# Patient Record
Sex: FEMALE | Race: WHITE | Employment: STUDENT | URBAN - NONMETROPOLITAN AREA
[De-identification: names, ages, dates, MRNs, and addresses within clinical notes are randomized per-mention and may not be internally consistent; named-entity substitution may affect disease eponyms.]

---
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
## 2025-01-22 ENCOUNTER — HOSPITAL ENCOUNTER (INPATIENT)
Age: 20
LOS: 2 days | Discharge: HOME | DRG: 817 | End: 2025-01-24
Attending: EMERGENCY MEDICINE | Admitting: PSYCHIATRY & NEUROLOGY
Payer: MEDICAID

## 2025-01-22 ENCOUNTER — APPOINTMENT (OUTPATIENT)
Dept: CT IMAGING | Age: 20
DRG: 817 | End: 2025-01-22
Payer: MEDICAID

## 2025-01-22 DIAGNOSIS — T50.902A INTENTIONAL OVERDOSE, INITIAL ENCOUNTER (HCC): Primary | ICD-10-CM

## 2025-01-22 DIAGNOSIS — N39.0 URINARY TRACT INFECTION WITHOUT HEMATURIA, SITE UNSPECIFIED: ICD-10-CM

## 2025-01-22 DIAGNOSIS — R45.851 SUICIDAL IDEATION: ICD-10-CM

## 2025-01-22 PROBLEM — T14.91XA SUICIDE ATTEMPT (HCC): Status: ACTIVE | Noted: 2025-01-22

## 2025-01-22 LAB
ALBUMIN SERPL-MCNC: 4.3 G/DL (ref 3.5–5.2)
ALP SERPL-CCNC: 119 U/L (ref 35–104)
ALT SERPL-CCNC: 16 U/L (ref 5–33)
AMPHET UR QL SCN: NEGATIVE
ANION GAP SERPL CALCULATED.3IONS-SCNC: 13 MMOL/L (ref 8–16)
APAP SERPL-MCNC: <5 UG/ML (ref 10–30)
AST SERPL-CCNC: 13 U/L (ref 5–32)
BACTERIA URNS QL MICRO: ABNORMAL /HPF
BARBITURATES UR QL SCN: NEGATIVE
BASOPHILS # BLD: 0 K/UL (ref 0–0.2)
BASOPHILS NFR BLD: 0.3 % (ref 0–1)
BENZODIAZ UR QL SCN: NEGATIVE
BILIRUB SERPL-MCNC: 0.4 MG/DL (ref 0.2–1.2)
BILIRUB UR QL STRIP: NEGATIVE
BUN SERPL-MCNC: 10 MG/DL (ref 6–20)
BUPRENORPHINE URINE: NEGATIVE
CALCIUM SERPL-MCNC: 9.4 MG/DL (ref 8.6–10)
CANNABINOIDS UR QL SCN: NEGATIVE
CHLORIDE SERPL-SCNC: 104 MMOL/L (ref 98–107)
CLARITY UR: ABNORMAL
CO2 SERPL-SCNC: 24 MMOL/L (ref 22–29)
COCAINE UR QL SCN: NEGATIVE
COLOR UR: YELLOW
CREAT SERPL-MCNC: 0.8 MG/DL (ref 0.5–0.9)
CRYSTALS URNS MICRO: ABNORMAL /HPF
DRUG SCREEN COMMENT UR-IMP: ABNORMAL
EOSINOPHIL # BLD: 0.2 K/UL (ref 0–0.6)
EOSINOPHIL NFR BLD: 2.3 % (ref 0–5)
EPI CELLS #/AREA URNS AUTO: 10 /HPF (ref 0–5)
ERYTHROCYTE [DISTWIDTH] IN BLOOD BY AUTOMATED COUNT: 13.1 % (ref 11.5–14.5)
ETHANOLAMINE SERPL-MCNC: <10 MG/DL (ref 0–10)
FENTANYL SCREEN, URINE: NEGATIVE
GLUCOSE SERPL-MCNC: 109 MG/DL (ref 70–99)
GLUCOSE UR STRIP.AUTO-MCNC: NEGATIVE MG/DL
HCG UR QL: NEGATIVE
HCT VFR BLD AUTO: 42.6 % (ref 37–47)
HGB BLD-MCNC: 14.4 G/DL (ref 12–16)
HGB UR STRIP.AUTO-MCNC: NEGATIVE MG/L
HYALINE CASTS #/AREA URNS AUTO: 7 /HPF (ref 0–8)
IMM GRANULOCYTES # BLD: 0 K/UL
KETONES UR STRIP.AUTO-MCNC: NEGATIVE MG/DL
LEUKOCYTE ESTERASE UR QL STRIP.AUTO: ABNORMAL
LYMPHOCYTES # BLD: 2.2 K/UL (ref 1.1–4.5)
LYMPHOCYTES NFR BLD: 22.8 % (ref 20–40)
MCH RBC QN AUTO: 29.9 PG (ref 27–31)
MCHC RBC AUTO-ENTMCNC: 33.8 G/DL (ref 33–37)
MCV RBC AUTO: 88.6 FL (ref 81–99)
METHADONE UR QL SCN: NEGATIVE
METHAMPHETAMINE, URINE: NEGATIVE
MONOCYTES # BLD: 0.6 K/UL (ref 0–0.9)
MONOCYTES NFR BLD: 6.3 % (ref 0–10)
NEUTROPHILS # BLD: 6.5 K/UL (ref 1.5–7.5)
NEUTS SEG NFR BLD: 68.1 % (ref 50–65)
NITRITE UR QL STRIP.AUTO: NEGATIVE
OPIATES UR QL SCN: NEGATIVE
OXYCODONE UR QL SCN: NEGATIVE
PCP UR QL SCN: NEGATIVE
PH UR STRIP.AUTO: 6.5 [PH] (ref 5–8)
PLATELET # BLD AUTO: 379 K/UL (ref 130–400)
PMV BLD AUTO: 9.5 FL (ref 9.4–12.3)
POTASSIUM SERPL-SCNC: 4 MMOL/L (ref 3.5–5.1)
PROT SERPL-MCNC: 8.1 G/DL (ref 6.4–8.3)
PROT UR STRIP.AUTO-MCNC: NEGATIVE MG/DL
RBC # BLD AUTO: 4.81 M/UL (ref 4.2–5.4)
RBC #/AREA URNS AUTO: 2 /HPF (ref 0–4)
SALICYLATES SERPL-MCNC: <0.3 MG/DL (ref 3–10)
SARS-COV-2 RDRP RESP QL NAA+PROBE: NOT DETECTED
SODIUM SERPL-SCNC: 141 MMOL/L (ref 136–145)
SP GR UR STRIP.AUTO: 1.02 (ref 1–1.03)
TRICYCLIC ANTIDEPRESSANTS, UR: POSITIVE
UROBILINOGEN UR STRIP.AUTO-MCNC: 1 E.U./DL
WBC # BLD AUTO: 9.5 K/UL (ref 4.8–10.8)
WBC #/AREA URNS AUTO: 12 /HPF (ref 0–5)

## 2025-01-22 PROCEDURE — 93005 ELECTROCARDIOGRAM TRACING: CPT | Performed by: EMERGENCY MEDICINE

## 2025-01-22 PROCEDURE — 87086 URINE CULTURE/COLONY COUNT: CPT

## 2025-01-22 PROCEDURE — 36415 COLL VENOUS BLD VENIPUNCTURE: CPT

## 2025-01-22 PROCEDURE — 80053 COMPREHEN METABOLIC PANEL: CPT

## 2025-01-22 PROCEDURE — 82306 VITAMIN D 25 HYDROXY: CPT

## 2025-01-22 PROCEDURE — 1240000000 HC EMOTIONAL WELLNESS R&B

## 2025-01-22 PROCEDURE — G0480 DRUG TEST DEF 1-7 CLASSES: HCPCS

## 2025-01-22 PROCEDURE — 99285 EMERGENCY DEPT VISIT HI MDM: CPT

## 2025-01-22 PROCEDURE — 80143 DRUG ASSAY ACETAMINOPHEN: CPT

## 2025-01-22 PROCEDURE — 80307 DRUG TEST PRSMV CHEM ANLYZR: CPT

## 2025-01-22 PROCEDURE — 80179 DRUG ASSAY SALICYLATE: CPT

## 2025-01-22 PROCEDURE — 84443 ASSAY THYROID STIM HORMONE: CPT

## 2025-01-22 PROCEDURE — 81001 URINALYSIS AUTO W/SCOPE: CPT

## 2025-01-22 PROCEDURE — 87635 SARS-COV-2 COVID-19 AMP PRB: CPT

## 2025-01-22 PROCEDURE — 6370000000 HC RX 637 (ALT 250 FOR IP): Performed by: PSYCHIATRY & NEUROLOGY

## 2025-01-22 PROCEDURE — 85025 COMPLETE CBC W/AUTO DIFF WBC: CPT

## 2025-01-22 PROCEDURE — 82607 VITAMIN B-12: CPT

## 2025-01-22 PROCEDURE — 87077 CULTURE AEROBIC IDENTIFY: CPT

## 2025-01-22 PROCEDURE — 82077 ASSAY SPEC XCP UR&BREATH IA: CPT

## 2025-01-22 PROCEDURE — 84703 CHORIONIC GONADOTROPIN ASSAY: CPT

## 2025-01-22 PROCEDURE — 6370000000 HC RX 637 (ALT 250 FOR IP): Performed by: EMERGENCY MEDICINE

## 2025-01-22 PROCEDURE — 70450 CT HEAD/BRAIN W/O DYE: CPT

## 2025-01-22 PROCEDURE — 87186 SC STD MICRODIL/AGAR DIL: CPT

## 2025-01-22 RX ORDER — TRAZODONE HYDROCHLORIDE 50 MG/1
50 TABLET, FILM COATED ORAL NIGHTLY PRN
Status: DISCONTINUED | OUTPATIENT
Start: 2025-01-22 | End: 2025-01-24 | Stop reason: HOSPADM

## 2025-01-22 RX ORDER — NITROFURANTOIN 25; 75 MG/1; MG/1
100 CAPSULE ORAL ONCE
Status: COMPLETED | OUTPATIENT
Start: 2025-01-22 | End: 2025-01-22

## 2025-01-22 RX ORDER — SUMATRIPTAN 50 MG/1
50 TABLET, FILM COATED ORAL PRN
COMMUNITY
Start: 2024-05-06 | End: 2025-05-07

## 2025-01-22 RX ORDER — POLYETHYLENE GLYCOL 3350 17 G/17G
17 POWDER, FOR SOLUTION ORAL DAILY PRN
Status: DISCONTINUED | OUTPATIENT
Start: 2025-01-22 | End: 2025-01-24 | Stop reason: HOSPADM

## 2025-01-22 RX ORDER — MECOBALAMIN 5000 MCG
5 TABLET,DISINTEGRATING ORAL NIGHTLY PRN
Status: DISCONTINUED | OUTPATIENT
Start: 2025-01-22 | End: 2025-01-24 | Stop reason: HOSPADM

## 2025-01-22 RX ORDER — HYDROXYZINE HYDROCHLORIDE 25 MG/1
25 TABLET, FILM COATED ORAL 3 TIMES DAILY PRN
Status: DISCONTINUED | OUTPATIENT
Start: 2025-01-22 | End: 2025-01-24 | Stop reason: HOSPADM

## 2025-01-22 RX ORDER — ACETAMINOPHEN 325 MG/1
650 TABLET ORAL EVERY 4 HOURS PRN
Status: DISCONTINUED | OUTPATIENT
Start: 2025-01-22 | End: 2025-01-24 | Stop reason: HOSPADM

## 2025-01-22 RX ADMIN — TRAZODONE HYDROCHLORIDE 50 MG: 50 TABLET ORAL at 23:10

## 2025-01-22 RX ADMIN — NITROFURANTOIN MONOHYDRATE/MACROCRYSTALS 100 MG: 75; 25 CAPSULE ORAL at 20:43

## 2025-01-22 RX ADMIN — Medication 5 MG: at 23:10

## 2025-01-22 RX ADMIN — HYDROXYZINE HYDROCHLORIDE 25 MG: 25 TABLET, FILM COATED ORAL at 23:10

## 2025-01-22 SDOH — ECONOMIC STABILITY: FOOD INSECURITY: WITHIN THE PAST 12 MONTHS, YOU WORRIED THAT YOUR FOOD WOULD RUN OUT BEFORE YOU GOT MONEY TO BUY MORE.: NEVER TRUE

## 2025-01-22 SDOH — ECONOMIC STABILITY: INCOME INSECURITY: HOW HARD IS IT FOR YOU TO PAY FOR THE VERY BASICS LIKE FOOD, HOUSING, MEDICAL CARE, AND HEATING?: NOT HARD AT ALL

## 2025-01-22 SDOH — ECONOMIC STABILITY: INCOME INSECURITY: IN THE PAST 12 MONTHS, HAS THE ELECTRIC, GAS, OIL, OR WATER COMPANY THREATENED TO SHUT OFF SERVICE IN YOUR HOME?: NO

## 2025-01-22 ASSESSMENT — ENCOUNTER SYMPTOMS
SHORTNESS OF BREATH: 0
SORE THROAT: 0
VOMITING: 0
RHINORRHEA: 0
NAUSEA: 1
ABDOMINAL PAIN: 0
BACK PAIN: 0
DIARRHEA: 0

## 2025-01-22 ASSESSMENT — PATIENT HEALTH QUESTIONNAIRE - PHQ9
2. FEELING DOWN, DEPRESSED OR HOPELESS: SEVERAL DAYS
SUM OF ALL RESPONSES TO PHQ QUESTIONS 1-9: 1
SUM OF ALL RESPONSES TO PHQ9 QUESTIONS 1 & 2: 1
SUM OF ALL RESPONSES TO PHQ QUESTIONS 1-9: 1
1. LITTLE INTEREST OR PLEASURE IN DOING THINGS: NOT AT ALL
SUM OF ALL RESPONSES TO PHQ QUESTIONS 1-9: 1
SUM OF ALL RESPONSES TO PHQ QUESTIONS 1-9: 1

## 2025-01-22 ASSESSMENT — SLEEP AND FATIGUE QUESTIONNAIRES: AVERAGE NUMBER OF SLEEP HOURS: 9

## 2025-01-22 NOTE — ED NOTES
Posion control called this RN and updated them on pt. Recommended continuing to monitor until 8 hours post ingestion and to call if salicylate and acetaminophen were high on repeats. Recommended symptomatic care for now. Dr. Ortiz notified

## 2025-01-22 NOTE — ED NOTES
Pt is alert, But is unable to answer some orientation questions correctly. 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 Unsure of pt cognitive functiion at baseline family notified me that she has a shunt in her brain
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
## 2025-01-22 NOTE — ED NOTES
Spoke with Brock at poison control. Per EMS pt took 20 of her Midol pills, pt verbalized that the name was Charlotte and she took them around 1100. Poison control notified me that these medications can contain caffeine, acetaminophen and aspirin. They said without having the exact bottle to treat situation as if they contain all those ingredients. They said high caffeine amounts can cause seiuzures Tachycarida and HTN. Recommended charcoal around 1 hour post ingestion. Recommended drawing a repeat aspirin and acetaminophen 4 hours post ingestion. If Acetaminophen is greater than 150 they recommended mucomyst or IV acetadose which they can provide the dosing once we get results. Can give Iv fluids if needed and Benzos for seizure like activity. Recommended to monitor 8 hours post ingestion.

## 2025-01-23 PROBLEM — F39 UNSPECIFIED MOOD (AFFECTIVE) DISORDER (HCC): Status: ACTIVE | Noted: 2025-01-23

## 2025-01-23 LAB
EKG P AXIS: 28 DEGREES
EKG P-R INTERVAL: 122 MS
EKG Q-T INTERVAL: 350 MS
EKG QRS DURATION: 100 MS
EKG QTC CALCULATION (BAZETT): 400 MS
EKG T AXIS: 12 DEGREES

## 2025-01-23 PROCEDURE — 6370000000 HC RX 637 (ALT 250 FOR IP): Performed by: FAMILY MEDICINE

## 2025-01-23 PROCEDURE — 93010 ELECTROCARDIOGRAM REPORT: CPT | Performed by: INTERNAL MEDICINE

## 2025-01-23 PROCEDURE — 1240000000 HC EMOTIONAL WELLNESS R&B

## 2025-01-23 PROCEDURE — 6370000000 HC RX 637 (ALT 250 FOR IP): Performed by: PSYCHIATRY & NEUROLOGY

## 2025-01-23 RX ORDER — CLONIDINE HYDROCHLORIDE 0.1 MG/1
0.2 TABLET ORAL ONCE
Status: COMPLETED | OUTPATIENT
Start: 2025-01-23 | End: 2025-01-23

## 2025-01-23 RX ORDER — CEPHALEXIN 500 MG/1
500 CAPSULE ORAL EVERY 12 HOURS SCHEDULED
Status: DISCONTINUED | OUTPATIENT
Start: 2025-01-23 | End: 2025-01-24 | Stop reason: HOSPADM

## 2025-01-23 RX ADMIN — CLONIDINE HYDROCHLORIDE 0.2 MG: 0.1 TABLET ORAL at 01:10

## 2025-01-23 RX ADMIN — HYDROXYZINE HYDROCHLORIDE 25 MG: 25 TABLET, FILM COATED ORAL at 21:30

## 2025-01-23 RX ADMIN — CEPHALEXIN 500 MG: 500 CAPSULE ORAL at 21:30

## 2025-01-23 RX ADMIN — Medication 5 MG: at 21:30

## 2025-01-23 RX ADMIN — TRAZODONE HYDROCHLORIDE 50 MG: 50 TABLET ORAL at 21:30

## 2025-01-23 RX ADMIN — HYDROXYZINE HYDROCHLORIDE 25 MG: 25 TABLET, FILM COATED ORAL at 13:07

## 2025-01-23 ASSESSMENT — SLEEP AND FATIGUE QUESTIONNAIRES
DO YOU HAVE DIFFICULTY SLEEPING: NO
SLEEP PATTERN: DIFFICULTY FALLING ASLEEP;INSOMNIA;DISTURBED/INTERRUPTED SLEEP
DO YOU USE A SLEEP AID: YES
AVERAGE NUMBER OF SLEEP HOURS: 9

## 2025-01-23 ASSESSMENT — LIFESTYLE VARIABLES
HOW OFTEN DO YOU HAVE A DRINK CONTAINING ALCOHOL: NEVER
HOW MANY STANDARD DRINKS CONTAINING ALCOHOL DO YOU HAVE ON A TYPICAL DAY: PATIENT DOES NOT DRINK

## 2025-01-23 NOTE — PLAN OF CARE
Problem: Coping  Goal: Pt/Family able to verbalize concerns and demonstrate effective coping strategies  Description: INTERVENTIONS:  1. Assist patient/family to identify coping skills, available support systems and cultural and spiritual values  2. Provide emotional support, including active listening and acknowledgement of concerns of patient and caregivers  3. Reduce environmental stimuli, as able  4. Instruct patient/family in relaxation techniques, as appropriate  5. Assess for spiritual pain/suffering and initiate Spiritual Care, Psychosocial Clinical Specialist consults as needed  1/23/2025 1521 by Kourtney Vargas MSW  Outcome: Progressing                                                                    Group Note    Date: 01/23/25  Start Time: 10:50 AM   End Time:11:25 AM     Number of Participants: 9    Type of Group: Psychotherapy     Patient's Goal: Motivating patient to interact and express different emotions in group.     Objective: Encouraged and provide opportunity to patient to discuss emotions and thoughts on personal experiences     Participation Level: Active Listener     Participation Quality: Appropriate and Attentive    Speech:  Mute    Thought Process/Content: Mute    Mood: Calm    Level of consciousness:  Alert     Response to Learning:  Able to retain information    Modes of Intervention: Education and Support    Discipline Responsible: /Counselor     Signature:  PIERO Lemons

## 2025-01-23 NOTE — FLOWSHEET NOTE
01/23/25 0038   Treatment Team Notification   Reason for Communication Evaluate;Review case   Name of Team Member Notified Dr Santiago   Treatment Team Role Attending Provider   Method of Communication Call   Response See orders   Notification Time 0039     Pt is new admission at 2144, pt states that she takes Elavil and Catapress 02. For sleep. Pt is sitting up in her bed, not able to sleep, called Dr Santiago, and notified her that patient took Trazodone 50 mg and melatonin 5 mg and Atarax 25 mg, and is not sleeping, Dr Santiago stated to order Clonidine 0.2 mg for sleep. Continue to monitor for medication side affects.

## 2025-01-23 NOTE — ED NOTES
ED TO INPATIENT SBAR HANDOFF    Patient Name: Sharath Perera   : 2005  19 y.o.   Family/Caregiver Present: No  Code Status Order: No Order    C-SSRS: Risk of Suicide: High Risk  Sitter No  Restraints:         Situation  Chief Complaint:   Chief Complaint   Patient presents with    Drug Overdose     Pt admits to taking 20 midol      Patient Diagnosis: No admission diagnoses are documented for this encounter.     Brief Description of Patient's Condition: Pt being admitted for SI. Pt attempted to overdose today on Midol. Reccomendations from poison control were followed and pt is out of the hour our post ingestion window. While in ED pt made phone call to family member stating that she was going to kill herself when she left this facility. Family called up here and notified staff member. Pt cooperative with staff, on RA and oriented x4. Pt is Blind in LT eye and deaf to RT ear.  Mental Status: oriented, alert  Arrived from: home    Imaging:   CT HEAD WO CONTRAST   Final Result   No acute intracranial abnormality.        All CT scans are performed using dose optimization techniques as appropriate to the performed exam and include    at least one of the following: Automated exposure control, adjustment of the mA and/or kV according to size, and the use of iterative reconstruction technique.        ______________________________________    Electronically signed by: ALEAH JACINTO M.D.   Date:     2025   Time:    13:57         COVID-19 Results:   Internal Administration   First Dose      Second Dose           Last COVID Lab SARS-CoV-2, NAAT (no units)   Date Value   2025 Not Detected           Abnormal labs:   Abnormal Labs Reviewed   CBC WITH AUTO DIFFERENTIAL - Abnormal; Notable for the following components:       Result Value    Neutrophils % 68.1 (*)     All other components within normal limits   COMPREHENSIVE METABOLIC PANEL - Abnormal; Notable for the following components:    Glucose 109 (*)

## 2025-01-23 NOTE — DISCHARGE INSTRUCTIONS
[facility]  [facility]*  (Call 211/[facility] for additional resources)    Emergency Shelter Assistance    [facility]” - for women and children.  [address], [place], [place] [address]  Website: https://[email or web address]/emergency-shelter/  [phone]    [facility]- for single men or  couples and children.   [address] [place], [place] [address]  Website: https://[facility].[email or web address]/  [phone]    [facility]- for women and children.  [place], [place]   Website: http://www.[employer]/  [phone]    [facility] [place]”- for [facility] residents with custody of children  [address], [place] [address]  [phone]    [facility]- for single men  Website: [email or web address]/  [address] [address]  [phone]    [facility]- for women and children fleeing domestic violence  Website: https://[employer].org  [place], [place]   24-hour crisis line- 5.964.882.8536 -or- [phone]    [place] [facility]- contacts familiar with local resources   Resource Guide: https://www.[facility].org/Programs/Documents/Community%20Resource%20Guide.pdf  [address] [place], [place] [address]  [phone] ext [phone] -or- [phone] ext [phone]    [place] [facility] ([facility])- You may call [place] APS to ask for help in finding shelter when you do not have other options, such as family or friends, or is the local shelters are full.   Local Phone [phone] (Open Monday through Friday 8:00am to 4:00pm)  Website: https://www.[facility].ky.gov/agencies/dcbs/dpp/apb  24 Hour HOTLINE: [phone]    [facility]  Website: https://www.[facility]/[place]/homeless/shelters#all   [address] [place] [address]  [phone]  [phone]    [facility]- Emergency Shelter

## 2025-01-23 NOTE — PLAN OF CARE
Problem: Safety - Adult  Goal: Free from fall injury  Outcome: Progressing     Problem: Anxiety  Goal: Will report anxiety at manageable levels  Description: INTERVENTIONS:  1. Administer medication as ordered  2. Teach and rehearse alternative coping skills  3. Provide emotional support with 1:1 interaction with staff  Outcome: Progressing     Problem: Coping  Goal: Pt/Family able to verbalize concerns and demonstrate effective coping strategies  Description: INTERVENTIONS:  1. Assist patient/family to identify coping skills, available support systems and cultural and spiritual values  2. Provide emotional support, including active listening and acknowledgement of concerns of patient and caregivers  3. Reduce environmental stimuli, as able  4. Instruct patient/family in relaxation techniques, as appropriate  5. Assess for spiritual pain/suffering and initiate Spiritual Care, Psychosocial Clinical Specialist consults as needed  Outcome: Progressing     Problem: Death & Dying  Goal: Pt/Family communicate acceptance of impending death and feel psychological comfort and peace  Description: INTERVENTIONS:  1. Assess patient/family anxiety and grief process related to end of life issues  2. Provide emotional and spiritual support  3. Provide information about the patient's health status with consideration of family and cultural values  4. Communicate willingness to discuss death and facilitate grief process  with patient/family as appropriate  5. Emphasize sustaining relationships within family system and community, or rosi/spiritual traditions  6. Initiate Spiritual Care, Psychosocial Clinical Specialist, consult as needed  Outcome: Progressing     Problem: Decision Making  Goal: Pt/Family able to effectively weigh alternatives and participate in decision making related to treatment and care  Description: INTERVENTIONS:  1. Determine when there are differences between patient's view, family's view, and healthcare

## 2025-01-24 VITALS
DIASTOLIC BLOOD PRESSURE: 92 MMHG | SYSTOLIC BLOOD PRESSURE: 133 MMHG | TEMPERATURE: 98.2 F | RESPIRATION RATE: 16 BRPM | WEIGHT: 231 LBS | OXYGEN SATURATION: 100 % | HEART RATE: 112 BPM

## 2025-01-24 LAB
25(OH)D3 SERPL-MCNC: 12.3 NG/ML
BACTERIA UR CULT: ABNORMAL
BACTERIA UR CULT: ABNORMAL
ORGANISM: ABNORMAL
TSH SERPL DL<=0.005 MIU/L-ACNC: 1.76 UIU/ML (ref 0.27–4.2)
VIT B12 SERPL-MCNC: 420 PG/ML (ref 232–1245)

## 2025-01-24 PROCEDURE — 6370000000 HC RX 637 (ALT 250 FOR IP): Performed by: FAMILY MEDICINE

## 2025-01-24 PROCEDURE — 5130000000 HC BRIDGE APPOINTMENT

## 2025-01-24 RX ORDER — CEPHALEXIN 500 MG/1
500 CAPSULE ORAL EVERY 12 HOURS SCHEDULED
Qty: 4 CAPSULE | Refills: 0 | Status: SHIPPED | OUTPATIENT
Start: 2025-01-24

## 2025-01-24 RX ADMIN — CEPHALEXIN 500 MG: 500 CAPSULE ORAL at 07:44

## 2025-01-24 NOTE — DISCHARGE SUMMARY
Discharge Summary     Patient ID:  Sharath Perera  [id number]  19 y.o.  2005    Admit date: 1/22/2025  Discharge date: 1/24/2025    Admitting Physician: Diana Santiago MD   Attending Physician: Cleo Dunn MD  Discharge Provider: CLEO DUNN MD     Chief Complaint: Drug overdose    Admission Diagnoses: Suicidal ideation [R45.851]  Urinary tract infection without hematuria, site unspecified [N39.0]  Intentional overdose, initial encounter ([facility]) [T50.902A]  Suicide attempt ([facility]) [T14.91XA]  Unspecified mood (affective) disorder ([facility]) [F39]    Discharge Diagnoses: Mood disorder, unspecified  Family relationship issues  History of ADHD  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 female previous psychiatric history of ADHD, who has been admitted to our psychiatric unit for mental health evaluation after suspected drug overdose.  Patient's UDS in ER was negative.  Her blood alcohol level was less than 10.     Patient has been seen in treatment team room with presence of the patient's nurse.  Patient reported that she previously lived with her grandmother, however, stated that a lot of people live with her grandmother, and it was noisy and stressful environment.  Also, patient stated that despite she was provided with food and the bed, she felt \"neglected, because nobody was paying attention on me\", said that nobody was talking to her and she felt like nobody cared about.       Patient stated that she moved to her boyfriend's house 3 days ago.  Patient reported that she has a good relationship with her boyfriend as he is caring and attentive person.  However, she stated that she still felt stressed and frustrated after leaving her grandmother's house and she took 3 tablets of the Pamprin with
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
## 2025-01-24 NOTE — PLAN OF CARE
Problem: Safety - Adult  Goal: Free from fall injury  Outcome: Progressing     Problem: Anxiety  Goal: Will report anxiety at manageable levels  Description: INTERVENTIONS:  1. Administer medication as ordered  2. Teach and rehearse alternative coping skills  3. Provide emotional support with 1:1 interaction with staff  Outcome: Progressing     Problem: Coping  Goal: Pt/Family able to verbalize concerns and demonstrate effective coping strategies  Description: INTERVENTIONS:  1. Assist patient/family to identify coping skills, available support systems and cultural and spiritual values  2. Provide emotional support, including active listening and acknowledgement of concerns of patient and caregivers  3. Reduce environmental stimuli, as able  4. Instruct patient/family in relaxation techniques, as appropriate  5. Assess for spiritual pain/suffering and initiate Spiritual Care, Psychosocial Clinical Specialist consults as needed  1/23/2025 1810 by Isma Phillips RN  Outcome: Progressing  1/23/2025 1521 by Kourtney Vargas MSW  Outcome: Progressing     Problem: Death & Dying  Goal: Pt/Family communicate acceptance of impending death and feel psychological comfort and peace  Description: INTERVENTIONS:  1. Assess patient/family anxiety and grief process related to end of life issues  2. Provide emotional and spiritual support  3. Provide information about the patient's health status with consideration of family and cultural values  4. Communicate willingness to discuss death and facilitate grief process  with patient/family as appropriate  5. Emphasize sustaining relationships within family system and community, or rosi/spiritual traditions  6. Initiate Spiritual Care, Psychosocial Clinical Specialist, consult as needed  Outcome: Progressing     Problem: Decision Making  Goal: Pt/Family able to effectively weigh alternatives and participate in decision making related to treatment and care  Description:

## 2025-01-24 NOTE — BH NOTE
Group Therapy Note    Date: 01/24/25  Start Time: 0800  End Time:0820    Number of Participants: 11    Type of Group: self inventory    Patient's Goal:  \"Getting to go home hopefully\"    Notes:      Status After Intervention:  Improved    Participation Level: Active Listener    Participation Quality: Appropriate    Speech:  normal    Thought Process/Content: Logical    Affective Functioning: Congruent    Mood: calm    Level of consciousness:  Alert and Oriented x4    Response to Learning: Progressing to goal    Modes of Intervention: Education and Support    Discipline Responsible: Registered Nurse    Signature: Almas Martinez RN

## 2025-01-24 NOTE — DISCHARGE INSTR - DIET

## 2025-01-24 NOTE — PROGRESS NOTES
Group Note    Date: 01/23/25  Start Time: 7:30 PM   End Time:8:00 PM     Number of Participants: 7    Type of Group: Wrap-Up     Patient's Goal:  None listed     Notes:  See wrap-up sheet     Status After Intervention:  Unchanged    Participation Level: Active Listener    Participation Quality: Appropriate    Speech:  normal    Thought Process/Content: Logical    Mood:  calm    Level of consciousness:  Alert    Response to Learning: Able to verbalize current knowledge/experience    Modes of Intervention: Education and Support    Discipline Responsible: Registered Nurse     Signature:  Jass Burk RN  
                                                                Group Note    Date: 01/23/25  Start Time: 8:00 AM   End Time:8:30 AM     Number of Participants: 10    Type of Group: Community/Goal     Patient's Goal:  \"Going to get better to see my family\"    Notes:      Status After Intervention:      Participation Level: Active Listener    Participation Quality: Appropriate    Speech:  normal    Thought Process/Content: Logical    Mood:  Calm    Level of consciousness:  Alert    Response to Learning: Able to verbalize current knowledge/experience    Modes of Intervention: Education and Support    Discipline Responsible: Behavioral Health Technician     Signature:  ELIZA AGUIAR  
                                                  Admission Note      Reason for admission/Target Symptom: Per nursing admission assessment - Reason for Admission: Sharath Perera, is a 19 year old female that is has attempted overdose today on Midol. pt is out of the hour post for ingestion window, While in ED pt alfonso phone call to family member stating lyle she is going to kill hersef when she left the facility. Family called up here and notified staff member. Pt is cooperataive with staff, on is alert and oriented X4. pt stated that she was feeling overwhelmed with family isses and did not think and reacted to being overwhelmed. Pt stated that she did not think correctly and responded incorrectly. 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    Diagnoses: Unspecified depression  UDS: Tricyclic   BAL:  Neg    SW will meet with treatment team to discuss patient's treatment including care planning, discharge planning, and follow-up needs. Patient has been admitted to [facility].     Treatment team will identify the patient's discharge needs. Appointments will be made for medication management and outpatient therapy/counseling. Pt confirmed the need for ongoing treatment post inpatient stay. Pt was also provided a handout of contact information for drug and alcohol treatment centers and other community support service such as [facility], AA, and [facility].  
                                                 Treatment Team Note:    Therapist met with treatment team to discuss patients treatment, progress toward treatment goals and discharge plans.    Target Symptoms/Reason for admission: Per nursing admission assessment - Reason for Admission: Sharath Perera, is a 19 year old female that is has attempted overdose today on Midol. pt is out of the hour post for ingestion window, While in ED pt alfonso phone call to family member stating lyle she is going to kill hersef when she left the facility. Family called up here and notified staff member. Pt is cooperataive with staff, on is alert and oriented X4. pt stated that she was feeling overwhelmed with family isses and did not think and reacted to being overwhelmed. Pt stated that she did not think correctly and responded incorrectly. 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    Diagnoses per psych provider: Suicidal ideation [R45.851]  Urinary tract infection without hematuria, site unspecified [N39.0]  Intentional overdose, initial encounter ([facility]) [T50.902A]  Suicide attempt ([facility]) [T14.91XA]    Patient's aftercare plan is:  [facility]    Aftercare appointments made:  Walk-in clinic-SW will put the information in pt's chart under follow up    Pt lives with:  gma    Collateral obtained from: SW will meet with patient to gather information    Attending groups: New admission - SW will monitor group attendance    Behavior: calm and cooperative    Has patient been completing ADL's:  New admission - unknown at this time - SW will monitor    Sleeping:New admission - unknown at this time.    Taking medication: New admission - unknown at this time.    Misc:                                                   
                                                 Treatment Team Note:    Therapist met with treatment team to discuss patients treatment, progress toward treatment goals and discharge plans.    Target Symptoms/Reason for admission: Per nursing admission assessment - Reason for Admission: Sharath Perera, is a 19 year old female that is has attempted overdose today on Midol. pt is out of the hour post for ingestion window, While in ED pt alfonso phone call to family member stating lyle she is going to kill hersef when she left the facility. Family called up here and notified staff member. Pt is cooperataive with staff, on is alert and oriented X4. pt stated that she was feeling overwhelmed with family isses and did not think and reacted to being overwhelmed. Pt stated that she did not think correctly and responded incorrectly. 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    Diagnoses per psych provider: Suicidal ideation [R45.851]  Urinary tract infection without hematuria, site unspecified [N39.0]  Intentional overdose, initial encounter ([facility]) [T50.902A]  Suicide attempt ([facility]) [T14.91XA]  Unspecified mood (affective) disorder ([facility]) [F39]    Patient's aftercare plan is:  [facility]     Aftercare appointments made:  walk in clinic    Pt lives with:  gma    Collateral obtained from:  Collateral was not obtained     Attending groups: Yes    Behavior: social    Has patient been completing ADL's:  Yes    Sleeping:Yes    Taking medication: Yes    Misc: Per night nursing note: Patient has been up in the milieu and social with her peers. She has reported having low depression. She reports that she is hoping to go home by the weekend. She denies SI, HI and AVH.                                                    
      Behavioral Services  Medicare Certification Upon Admission    I certify that this patient's inpatient psychiatric hospital admission is medically necessary for:    [x] (1) Treatment which could reasonably be expected to improve this patient's condition,       [x] (2) Or for diagnostic study;     AND     [x](2) The inpatient psychiatric services are provided while the individual is under the care of a physician and are included in the individualized plan of care.    Estimated length of stay/service 3-5 days    Plan for post-hospital care TBD    Electronically signed by CLEO HARMON MD on 1/23/2025 at 11:06 AM      
 Nursing Admission Note    Reason for Admission: Sharath Perera, is a 19 year old female that is has attempted overdose today on Midol. pt is out of the hour post for ingestion window, While in ED pt alfonso phone call to family member stating lyle she is going to kill hersef when she left the facility. Family called up here and notified staff member. Pt is cooperataive with staff, on is alert and oriented X4. pt stated that she was feeling overwhelmed with family isses and did not think and reacted to being overwhelmed. Pt stated that she did not think correctly and responded incorrectly. 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    Additional Notes:  Pt admission to unit in wheelchair, in scrubs with tech and security, with gray bag, pt searched, no contraband found at time of admission. Pt left eye blind and right ear deaf, pt had no hearing aids with her at time of admission and no eye glasses at time of admission. Pt admission completed and legal documents signed and completed, pt stated that her grandmother is her legal guardian, Nelda Lindsey.     Patient Active Problem List   Diagnosis    Suicide attempt (HCC)       C-SSRS:  C-SSRS Completed: yes.    Risk Assessment Completed: yes.    Risk of Suicide per C-SSRS: Risk of Suicide: High Risk  Provider Notified of the C-SSRS Screening and   Risk Assessment Findings: yes.    Order for Constant Observer Continued: no.    If no, discontinued due to the following reasons:  Patient is on 15 minute safety checks yes.  Safety Features on the unit: yes.    No previous safety concerns while on unit: yes.  Patient reporting no thoughts of self-harm while on unit: no.      Suicidal Ideation: no.    If yes, is there a plan?(Describe) denies, (\"earlier if felt overwhelmed and made the wrong decision, and this is not going to happen again\")  Homicidal Ideation:  no.   If 
Behavioral Health   Discharge Note  Bridge Appointment completed: Reviewed Discharge Instructions with patient.    Patient verbalizes understanding and agreement with the discharge plan using the teachback method.     Referral for Outpatient Tobacco Cessation Counseling, upon discharge (gina X if applicable and completed):    ( )  Hospital staff assisted patient to call Quit Line or faxed referral                                   during hospitalization                  ( )  Recognizing danger situations (included triggers and roadblocks), if not completed on admission                    ( )  Coping skills (new ways to manage stress, exercise, relaxation techniques, changing routine, distraction), if not completed on admission                                                           ( )  Basic information about quitting (benefits of quitting, techniques in how to quit, available resources, if not completed on admission  ( ) Ref[facility]   ( ) Patient refused referral  ( ) Patient refused counseling  ( ) Patient refused smoking cessation medication upon discharge  (x ) Patient non-tobacco use    Vaccinations (gina X if applicable and completed):  ( ) Patient states already received influenza vaccine elsewhere  ( ) Patient received influenza vaccine during this hospitalization  (x ) Patient refused influenza vaccine at this time      Pt discharged with followings belongings:  Dental Appliances: None  Vision - Corrective Lenses: Eyeglasses (glasses are broken)  Hearing Aid: Left hearing aid, Right hearing aid  Jewelry: Ring  Body Piercings Removed: No (ears are pierced)  Clothing: Footwear, Socks, Undergarments, Jacket/Coat, Pants, Shirt  Other Valuables: Other (Comment) (none)   Valuables sent home with patient.   Valuables retrieved from safe and returned to patient.    Patient left department with family   via personal vehicle  , discharged to home .   Patient education 
CLINICAL PHARMACY NOTE: MEDS TO BEDS    Total # of Prescriptions Filled: 1   The following medications were delivered to the patient:  Current Discharge Medication List        START taking these medications    Details   cephALEXin (KEFLEX) 500 MG capsule Take 1 capsule by mouth every 12 hours  Qty: 4 capsule, Refills: 0               Additional Documentation:    Handed script to Renee Tello) at nurses station. Zero copay.   
Discharge Note     Patient is discharging on this date. Patient denies SI, HI, and AVH at this time. Patient reports improvement in behavior and is leaving unit in overall good condition. SW and patient discussed patient's follow up appointments and importance of attending appointments as scheduled, patient voiced understanding and agreement. Patient and SW also discussed patient's safety plan and patient was able to verbally identify: warning signs, coping strategies, places and people that help make the patient feel better/distract negative thoughts, friends/family/agencies/professionals the patient can reach out to in a crisis, and something that is important to the patient/worth living for. Patient was provided the national suicide prevention hotline number (1-960.301.2109) as well as local community behavioral health ([facility]) crisis number for emergencies (1-713.435.1607).     Discharge Disposition: home -lives with family      Pt to follow up with [facility] and will go to their walk in clinic within one week any time Monday through Friday between the hours of 8:00 am-4:00 pm and from there they will schedule her appointments for therapy and medication management.     Referral to outpatient tobacco cessation counseling treatment:  Patient refused referral to outpatient tobacco cessation counseling    SW offered to assist patient with transportation, patient declined transportation assistance, she reports that her gma will be picking her up today.     
Discharge OQ-30 Questionnaire Imported/completed  
MARY spoke with pt about her safe discharge plan since she will be leaving the hospital today, and she reports that she will return home where she lives with her gma in [place], [place] and that her gma will be picking her up today. MARY reminded pt that she will need to go to the [facility] walk in clinic within the next week and once they get her information, she will be able to get her follow up appointments scheduled with them for therapy and medication management. MARY told her that their walk in clinic details will be listed on her discharge paperwork.   
SW met with patient to complete psychosocial and lifetime CSSR-S on this date. Patients long and short-term goals discussed. Patient voiced understanding. Treatment plan sheet signed. Patient verbalized understanding of the treatment plan. Patient participated in goals and objectives of the treatment plan. Patient discussed safety plan with .    In the last 6 months has the patient been a danger to self: Yes  In the last 6 months has the patient been a danger to others: No  Legal Guardian/POA: No    Activity Assessment:  Skills:   Talents:   Interests:     Provided patient with [employer] handout entitled \"Quitting Smoking.\"  Reviewed handout with patient: addressing dangers of smoking, developing coping skills, and providing basic information about quitting.       Patient received all components practical counseling of tobacco practical counseling during the hospital stay.        
SW spoke with pt about a safe discharge plan for when she is ready to leave the hospital, and she reports that she will return home where she lives with her grandmother, who she states is also her guardian, in [place], [place] and that she gma will pick her up when it's time to leave. Pt denied receiving any outpatient services currently and said that [facility] was fine to follow up with. SW will put [facility]'s information under follow up in the pt's chart since they are a walk in clinic only, and they do not make appointments.   
[facility] Adult Unit Daily Assessment  Nursing Progress Note    [facility] [address] [place] [place] [address]   Name: Sharath Perera   Age: 19 y.o.   Gender: female   Dx: Suicide attempt (HCC)  Precautions: suicide risk and fall risk  Inpatient Status: voluntary     SLEEP:  Sleep Quality Fair  Sleep Medications:  No   PRN Sleep Meds: Yes     MEDICAL:  Other PRN Meds: Yes   Med Compliant: Yes  Accu-Chek: No  Oxygen/CPAP/BiPAP: No  CIWA/CINA: No   PAIN Assessment: none  Side Effects from medication: No    Metabolic Screening:  No results found for: \"LABA1C\"  No results found for: \"CHOL\"  No results found for: \"TRIG\"  No results found for: \"HDL\"  No components found for: \"LDLCAL\"  No components found for: \"LABVLDL\"  There is no height or weight on file to calculate BMI.  BP Readings from Last 2 Encounters:   01/23/25 124/77   01/14/20 (!) 124/90 (92%, Z = 1.41 /  >99 %, Z >2.33)*     *BP percentiles are based on the 2017 AAP Clinical Practice Guideline for girls       Medical Bed:   Is patient in a medical bed? no   If medical bed is in use, has nursing secured room while patient is awake and out of the room? NA  Has safety checks by nursing been completed on the bed/room this shift? NA    Protective Factors:  Patient identifies protective factors with nursing staff as follows:   Identifies reasons for living: Yes   Supportive Social Network or family: Yes    Belief that suicide is immoral/high spirituality: Yes   Responsibility to family or others/living with family: Yes   Fear of death or dying due to pain and suffering: No   Engaged in work or school: No  If Patient is unable to identify, reason why?     Depression: 0   Anxiety: low   SI denies suicidal ideation   Risk of Suicide: No Risk  HI Negative for homicidal ideation        AVH:no If Hallucinations are present, describe? N/a    Appetite: no change from normal   Percent Meals:    Social: Yes   Speech: normal   Appearance: appropriately dressed    GROUP:  Group Participation: 
looking    GROUP:  Group Participation: Yes  Participation Quality: Active Listener and Interactive    Notes: Patient was talking on the phone with her family after breakfast. Patient finished and then sat in the day area by herself. Patient stated that she misses her family and says that her anxiety level is higher when she is not able to see them. Patient denies her depression but says her anxiety is \"bad.\" Patient denies SI, HI and AVH.         Electronically signed by Isma Phillips RN on 1/23/25 at 6:03 PM CST

## 2025-01-25 ENCOUNTER — FOLLOWUP TELEPHONE ENCOUNTER (OUTPATIENT)
Dept: PSYCHIATRY | Age: 20
End: 2025-01-25

## 2025-01-25 NOTE — TELEPHONE ENCOUNTER
SW attempted to follow up with pt after her discharge from the unit yesterday, and spoke with her. Pt reports being better now that she is back home from here and said she didn't have any questions or concerns at this time.

## 2025-05-06 ENCOUNTER — HOSPITAL ENCOUNTER (EMERGENCY)
Facility: HOSPITAL | Age: 20
Discharge: HOME | End: 2025-05-06
Attending: EMERGENCY MEDICINE | Admitting: EMERGENCY MEDICINE
Payer: COMMERCIAL

## 2025-05-06 VITALS
HEIGHT: 67 IN | WEIGHT: 218 LBS | SYSTOLIC BLOOD PRESSURE: 137 MMHG | TEMPERATURE: 98.1 F | RESPIRATION RATE: 16 BRPM | BODY MASS INDEX: 34.21 KG/M2 | HEART RATE: 98 BPM | OXYGEN SATURATION: 100 % | DIASTOLIC BLOOD PRESSURE: 92 MMHG

## 2025-05-06 DIAGNOSIS — N39.0 ACUTE UTI: ICD-10-CM

## 2025-05-06 DIAGNOSIS — R10.2 ACUTE PELVIC PAIN: Primary | ICD-10-CM

## 2025-05-06 LAB
AMPHET+METHAMPHET UR QL: NEGATIVE
AMPHETAMINES UR QL: NEGATIVE
B-HCG UR QL: NEGATIVE
BACTERIA UR QL AUTO: ABNORMAL /HPF
BARBITURATES UR QL SCN: NEGATIVE
BENZODIAZ UR QL SCN: NEGATIVE
BILIRUB UR QL STRIP: NEGATIVE
BUPRENORPHINE SERPL-MCNC: NEGATIVE NG/ML
CANNABINOIDS SERPL QL: NEGATIVE
CLARITY UR: ABNORMAL
COCAINE UR QL: NEGATIVE
COLOR UR: ABNORMAL
FENTANYL UR-MCNC: NEGATIVE NG/ML
GLUCOSE UR STRIP-MCNC: NEGATIVE MG/DL
HGB UR QL STRIP.AUTO: ABNORMAL
HYALINE CASTS UR QL AUTO: ABNORMAL /LPF
KETONES UR QL STRIP: ABNORMAL
LEUKOCYTE ESTERASE UR QL STRIP.AUTO: ABNORMAL
METHADONE UR QL SCN: NEGATIVE
MUCOUS THREADS URNS QL MICRO: ABNORMAL /HPF
NITRITE UR QL STRIP: NEGATIVE
OPIATES UR QL: NEGATIVE
OXYCODONE UR QL SCN: NEGATIVE
PCP UR QL SCN: NEGATIVE
PH UR STRIP.AUTO: 5.5 [PH] (ref 5–8)
PROT UR QL STRIP: ABNORMAL
RBC # UR STRIP: ABNORMAL /HPF
REF LAB TEST METHOD: ABNORMAL
SP GR UR STRIP: 1.03 (ref 1–1.03)
SQUAMOUS #/AREA URNS HPF: ABNORMAL /HPF
TRICYCLICS UR QL SCN: POSITIVE
UROBILINOGEN UR QL STRIP: ABNORMAL
WBC # UR STRIP: ABNORMAL /HPF

## 2025-05-06 PROCEDURE — 99283 EMERGENCY DEPT VISIT LOW MDM: CPT | Performed by: EMERGENCY MEDICINE

## 2025-05-06 PROCEDURE — 81025 URINE PREGNANCY TEST: CPT | Performed by: EMERGENCY MEDICINE

## 2025-05-06 PROCEDURE — 81001 URINALYSIS AUTO W/SCOPE: CPT | Performed by: EMERGENCY MEDICINE

## 2025-05-06 PROCEDURE — 80307 DRUG TEST PRSMV CHEM ANLYZR: CPT | Performed by: EMERGENCY MEDICINE

## 2025-05-06 RX ORDER — NITROFURANTOIN 25; 75 MG/1; MG/1
100 CAPSULE ORAL 2 TIMES DAILY
Qty: 10 CAPSULE | Refills: 0 | Status: SHIPPED | OUTPATIENT
Start: 2025-05-06 | End: 2025-05-11

## 2025-05-07 NOTE — ED PROVIDER NOTES
"Subjective   History of Present Illness  Pt presents to the ED with c/o \"I took a home pregnancy test a while back and it was positive so I came in to confirm if I was pregnant.\"  Pt then reports she has had some pelvic cramping and spotting that began last night.  Denies any dysuria/hematuria.  No n/v/f/c.  No lightheaded/dizziness.  No injuries.  Reports that she thinks that her LMP was in March.        Review of Systems   Constitutional:  Negative for fever.   Respiratory:  Negative for shortness of breath.    Genitourinary:  Positive for pelvic pain and vaginal bleeding. Negative for dysuria, hematuria and vaginal discharge.   Neurological:  Negative for dizziness and light-headedness.   All other systems reviewed and are negative.      No past medical history on file.    No Known Allergies    No past surgical history on file.    No family history on file.             Objective   Physical Exam  Vitals and nursing note reviewed.   Constitutional:       General: She is not in acute distress.  HENT:      Head: Normocephalic and atraumatic.      Mouth/Throat:      Mouth: Mucous membranes are moist.   Cardiovascular:      Rate and Rhythm: Regular rhythm. Tachycardia present.      Pulses: Normal pulses.      Heart sounds: Normal heart sounds.   Pulmonary:      Effort: Pulmonary effort is normal.      Breath sounds: Normal breath sounds.   Abdominal:      General: Abdomen is flat.      Palpations: Abdomen is soft.   Skin:     General: Skin is warm and dry.      Capillary Refill: Capillary refill takes less than 2 seconds.   Neurological:      Mental Status: She is alert.         Procedures           ED Course      Labs Reviewed   URINALYSIS W/ MICROSCOPIC IF INDICATED (NO CULTURE) - Abnormal; Notable for the following components:       Result Value    Color, UA Dark Yellow (*)     Appearance, UA Cloudy (*)     Ketones, UA 40 mg/dL (2+) (*)     Blood, UA Trace (*)     Protein, UA 30 mg/dL (1+) (*)     Leuk Esterase, UA " Trace (*)     All other components within normal limits   URINE DRUG SCREEN - Abnormal; Notable for the following components:    Tricyclic Antidepressants Screen Positive (*)     All other components within normal limits    Narrative:     Cutoff For Drugs Screened:    Amphetamines               500 ng/ml  Barbiturates               200 ng/ml  Benzodiazepines            150 ng/ml  Cocaine                    150 ng/ml  Methadone                  200 ng/ml  Opiates                    100 ng/ml  Phencyclidine               25 ng/ml  THC                         50 ng/ml  Methamphetamine            500 ng/ml  Tricyclic Antidepressants  300 ng/ml  Oxycodone                  100 ng/ml  Buprenorphine               10 ng/ml    The normal value for all drugs tested is negative. This report includes unconfirmed screening results, with the cutoff values listed, to be used for medical treatment purposes only.  Unconfirmed results must not be used for non-medical purposes such as employment or legal testing.  Clinical consideration should be applied to any drug of abuse test, particularly when unconfirmed results are used.     URINALYSIS, MICROSCOPIC ONLY - Abnormal; Notable for the following components:    WBC, UA 3-5 (*)     Bacteria, UA 1+ (*)     Squamous Epithelial Cells, UA 13-20 (*)     Mucus, UA Small/1+ (*)     All other components within normal limits   PREGNANCY, URINE - Normal   FENTANYL, URINE - Normal    Narrative:     Negative Threshold:      Fentanyl 5 ng/mL     The normal value for the drug tested is negative. This report includes final unconfirmed screening results to be used for medical treatment purposes only. Unconfirmed results must not be used for non-medical purposes such as employment or legal testing. Clinical consideration should be applied to any drug of abuse test, particularly when unconfirmed results are used.                                                               Medical Decision Making  Pt  stable in ED - NS abdomen.  NAD att.  Pregnancy test negative in ED - no evid of EP.  PT with some mild findings s/o UTI - given her discomfort will cover for UTI.  Will d/c to home.  Recommend outpt f/u.  Prec given        Final diagnoses:   Acute pelvic pain   Acute UTI       ED Disposition  ED Disposition       ED Disposition   Discharge    Condition   Stable    Comment   --               Provider, No Known  [facility]  [place] [address]  [phone]               Medication List        New Prescriptions      nitrofurantoin (macrocrystal-monohydrate) 100 MG capsule  Commonly known as: MACROBID  Take 1 capsule by mouth 2 (Two) Times a Day for 5 days.               Where to Get Your Medications        These medications were sent to [facility] - [place], [place] - [address] [place] [address] - [phone]  - [phone] FX  [address] [place] [id number]      Phone: [phone]   nitrofurantoin (macrocrystal-monohydrate) 100 MG capsule            Victor M Anguiano DO  05/06/25 1935       Victor M Anguiano DO  05/06/25 2058

## 2025-06-18 ENCOUNTER — HOSPITAL ENCOUNTER (EMERGENCY)
Age: 20
Discharge: HOME | End: 2025-06-18
Attending: EMERGENCY MEDICINE
Payer: MEDICAID

## 2025-06-18 VITALS
RESPIRATION RATE: 20 BRPM | OXYGEN SATURATION: 98 % | SYSTOLIC BLOOD PRESSURE: 106 MMHG | DIASTOLIC BLOOD PRESSURE: 76 MMHG | HEART RATE: 115 BPM | TEMPERATURE: 98.9 F

## 2025-06-18 DIAGNOSIS — F12.90 USE OF CANNABINOID EDIBLES: Primary | ICD-10-CM

## 2025-06-18 LAB
ALBUMIN SERPL-MCNC: 3.9 G/DL (ref 3.5–5.2)
ALP SERPL-CCNC: 113 U/L (ref 35–104)
ALT SERPL-CCNC: 11 U/L (ref 10–35)
AMPHET UR QL SCN: NEGATIVE
ANION GAP SERPL CALCULATED.3IONS-SCNC: 13 MMOL/L (ref 8–16)
AST SERPL-CCNC: 17 U/L (ref 10–35)
BARBITURATES UR QL SCN: NEGATIVE
BASOPHILS # BLD: 0 K/UL (ref 0–0.2)
BASOPHILS NFR BLD: 0.2 % (ref 0–1)
BENZODIAZ UR QL SCN: NEGATIVE
BILIRUB SERPL-MCNC: 0.4 MG/DL (ref 0.2–1.2)
BUN SERPL-MCNC: 11 MG/DL (ref 6–20)
BUPRENORPHINE URINE: NEGATIVE
CALCIUM SERPL-MCNC: 8.7 MG/DL (ref 8.6–10)
CANNABINOIDS UR QL SCN: POSITIVE
CHLORIDE SERPL-SCNC: 107 MMOL/L (ref 98–107)
CO2 SERPL-SCNC: 21 MMOL/L (ref 22–29)
COCAINE UR QL SCN: NEGATIVE
CREAT SERPL-MCNC: 0.9 MG/DL (ref 0.5–0.9)
DRUG SCREEN COMMENT UR-IMP: ABNORMAL
EKG P AXIS: 42 DEGREES
EKG P-R INTERVAL: 122 MS
EKG Q-T INTERVAL: 298 MS
EKG QRS DURATION: 90 MS
EKG QTC CALCULATION (BAZETT): 427 MS
EKG T AXIS: 16 DEGREES
EOSINOPHIL # BLD: 0.1 K/UL (ref 0–0.6)
EOSINOPHIL NFR BLD: 0.4 % (ref 0–5)
ERYTHROCYTE [DISTWIDTH] IN BLOOD BY AUTOMATED COUNT: 13.2 % (ref 11.5–14.5)
ETHANOLAMINE SERPL-MCNC: <11 MG/DL (ref 0–11)
FENTANYL SCREEN, URINE: NEGATIVE
GLUCOSE SERPL-MCNC: 106 MG/DL (ref 70–99)
HCG SERPL QL: NEGATIVE
HCT VFR BLD AUTO: 37.8 % (ref 37–47)
HGB BLD-MCNC: 12.7 G/DL (ref 12–16)
IMM GRANULOCYTES # BLD: 0.1 K/UL
LYMPHOCYTES # BLD: 2 K/UL (ref 1.1–4.5)
LYMPHOCYTES NFR BLD: 11.9 % (ref 20–40)
MCH RBC QN AUTO: 29.7 PG (ref 27–31)
MCHC RBC AUTO-ENTMCNC: 33.6 G/DL (ref 33–37)
MCV RBC AUTO: 88.3 FL (ref 81–99)
METHADONE UR QL SCN: NEGATIVE
METHAMPHETAMINE, URINE: NEGATIVE
MONOCYTES # BLD: 0.8 K/UL (ref 0–0.9)
MONOCYTES NFR BLD: 4.7 % (ref 0–10)
NEUTROPHILS # BLD: 13.7 K/UL (ref 1.5–7.5)
NEUTS SEG NFR BLD: 82.5 % (ref 50–65)
OPIATES UR QL SCN: NEGATIVE
OXYCODONE UR QL SCN: NEGATIVE
PCP UR QL SCN: NEGATIVE
PLATELET # BLD AUTO: 378 K/UL (ref 130–400)
PMV BLD AUTO: 9.3 FL (ref 9.4–12.3)
POTASSIUM SERPL-SCNC: 4 MMOL/L (ref 3.5–5.1)
PROT SERPL-MCNC: 7 G/DL (ref 6.4–8.3)
RBC # BLD AUTO: 4.28 M/UL (ref 4.2–5.4)
SODIUM SERPL-SCNC: 141 MMOL/L (ref 136–145)
TRICYCLIC ANTIDEPRESSANTS, UR: POSITIVE
WBC # BLD AUTO: 16.5 K/UL (ref 4.8–10.8)

## 2025-06-18 PROCEDURE — 99284 EMERGENCY DEPT VISIT MOD MDM: CPT

## 2025-06-18 PROCEDURE — 84703 CHORIONIC GONADOTROPIN ASSAY: CPT

## 2025-06-18 PROCEDURE — 36415 COLL VENOUS BLD VENIPUNCTURE: CPT

## 2025-06-18 PROCEDURE — 85025 COMPLETE CBC W/AUTO DIFF WBC: CPT

## 2025-06-18 PROCEDURE — G0480 DRUG TEST DEF 1-7 CLASSES: HCPCS

## 2025-06-18 PROCEDURE — 80307 DRUG TEST PRSMV CHEM ANLYZR: CPT

## 2025-06-18 PROCEDURE — 93005 ELECTROCARDIOGRAM TRACING: CPT

## 2025-06-18 PROCEDURE — 82077 ASSAY SPEC XCP UR&BREATH IA: CPT

## 2025-06-18 PROCEDURE — 80053 COMPREHEN METABOLIC PANEL: CPT
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
## 2025-06-18 NOTE — ED NOTES
Yissel, grandmother, called to leave her phone number:    [phone]    Patient gave me verbal consent to speak with her.

## 2025-06-24 LAB
EKG P AXIS: 42 DEGREES
EKG P-R INTERVAL: 122 MS
EKG Q-T INTERVAL: 298 MS
EKG QRS DURATION: 90 MS
EKG QTC CALCULATION (BAZETT): 427 MS
EKG T AXIS: 16 DEGREES

## 2025-06-25 ASSESSMENT — ENCOUNTER SYMPTOMS
ABDOMINAL PAIN: 0
VOMITING: 0
SHORTNESS OF BREATH: 0

## 2025-08-10 ENCOUNTER — HOSPITAL ENCOUNTER (EMERGENCY)
Facility: HOSPITAL | Age: 20
Discharge: HOME | End: 2025-08-11
Attending: EMERGENCY MEDICINE | Admitting: EMERGENCY MEDICINE
Payer: COMMERCIAL

## 2025-08-10 DIAGNOSIS — N39.0 ACUTE UTI (URINARY TRACT INFECTION): ICD-10-CM

## 2025-08-10 DIAGNOSIS — R10.9 ACUTE ABDOMINAL PAIN: Primary | ICD-10-CM

## 2025-08-10 PROCEDURE — 99284 EMERGENCY DEPT VISIT MOD MDM: CPT | Performed by: EMERGENCY MEDICINE

## 2025-08-10 RX ORDER — KETOROLAC TROMETHAMINE 30 MG/ML
30 INJECTION, SOLUTION INTRAMUSCULAR; INTRAVENOUS ONCE
Status: COMPLETED | OUTPATIENT
Start: 2025-08-11 | End: 2025-08-11

## 2025-08-10 RX ORDER — SODIUM CHLORIDE 0.9 % (FLUSH) 0.9 %
10 SYRINGE (ML) INJECTION AS NEEDED
Status: DISCONTINUED | OUTPATIENT
Start: 2025-08-10 | End: 2025-08-11 | Stop reason: HOSPADM

## 2025-08-11 ENCOUNTER — APPOINTMENT (OUTPATIENT)
Dept: CT IMAGING | Facility: HOSPITAL | Age: 20
End: 2025-08-11
Payer: COMMERCIAL

## 2025-08-11 VITALS
TEMPERATURE: 98.1 F | WEIGHT: 218 LBS | SYSTOLIC BLOOD PRESSURE: 112 MMHG | DIASTOLIC BLOOD PRESSURE: 78 MMHG | OXYGEN SATURATION: 100 % | HEIGHT: 67 IN | RESPIRATION RATE: 20 BRPM | HEART RATE: 98 BPM | BODY MASS INDEX: 34.21 KG/M2

## 2025-08-11 LAB
ALBUMIN SERPL-MCNC: 3.8 G/DL (ref 3.5–5.2)
ALBUMIN/GLOB SERPL: 1 G/DL
ALP SERPL-CCNC: 101 U/L (ref 39–117)
ALT SERPL W P-5'-P-CCNC: 9 U/L (ref 1–33)
ANION GAP SERPL CALCULATED.3IONS-SCNC: 11 MMOL/L (ref 5–15)
AST SERPL-CCNC: 11 U/L (ref 1–32)
B-HCG UR QL: NEGATIVE
BACTERIA UR QL AUTO: ABNORMAL /HPF
BASOPHILS # BLD AUTO: 0.05 10*3/MM3 (ref 0–0.2)
BASOPHILS NFR BLD AUTO: 0.4 % (ref 0–1.5)
BILIRUB SERPL-MCNC: <0.2 MG/DL (ref 0–1.2)
BILIRUB UR QL STRIP: NEGATIVE
BUN SERPL-MCNC: 6.9 MG/DL (ref 6–20)
BUN/CREAT SERPL: 11.9 (ref 7–25)
CALCIUM SPEC-SCNC: 9.1 MG/DL (ref 8.6–10.5)
CHLORIDE SERPL-SCNC: 106 MMOL/L (ref 98–107)
CLARITY UR: ABNORMAL
CO2 SERPL-SCNC: 22 MMOL/L (ref 22–29)
COLOR UR: YELLOW
CREAT SERPL-MCNC: 0.58 MG/DL (ref 0.57–1)
DEPRECATED RDW RBC AUTO: 41.3 FL (ref 37–54)
EGFRCR SERPLBLD CKD-EPI 2021: 133.1 ML/MIN/1.73
EOSINOPHIL # BLD AUTO: 0.21 10*3/MM3 (ref 0–0.4)
EOSINOPHIL NFR BLD AUTO: 1.5 % (ref 0.3–6.2)
ERYTHROCYTE [DISTWIDTH] IN BLOOD BY AUTOMATED COUNT: 12.8 % (ref 12.3–15.4)
GLOBULIN UR ELPH-MCNC: 3.7 GM/DL
GLUCOSE SERPL-MCNC: 96 MG/DL (ref 65–99)
GLUCOSE UR STRIP-MCNC: NEGATIVE MG/DL
HCT VFR BLD AUTO: 41.8 % (ref 34–46.6)
HGB BLD-MCNC: 14.1 G/DL (ref 12–15.9)
HGB UR QL STRIP.AUTO: ABNORMAL
HYALINE CASTS UR QL AUTO: ABNORMAL /LPF
IMM GRANULOCYTES # BLD AUTO: 0.05 10*3/MM3 (ref 0–0.05)
IMM GRANULOCYTES NFR BLD AUTO: 0.4 % (ref 0–0.5)
KETONES UR QL STRIP: NEGATIVE
LEUKOCYTE ESTERASE UR QL STRIP.AUTO: ABNORMAL
LIPASE SERPL-CCNC: 20 U/L (ref 13–60)
LYMPHOCYTES # BLD AUTO: 3.76 10*3/MM3 (ref 0.7–3.1)
LYMPHOCYTES NFR BLD AUTO: 26.4 % (ref 19.6–45.3)
MCH RBC QN AUTO: 29.7 PG (ref 26.6–33)
MCHC RBC AUTO-ENTMCNC: 33.7 G/DL (ref 31.5–35.7)
MCV RBC AUTO: 88.2 FL (ref 79–97)
MONOCYTES # BLD AUTO: 1.08 10*3/MM3 (ref 0.1–0.9)
MONOCYTES NFR BLD AUTO: 7.6 % (ref 5–12)
NEUTROPHILS NFR BLD AUTO: 63.7 % (ref 42.7–76)
NEUTROPHILS NFR BLD AUTO: 9.07 10*3/MM3 (ref 1.7–7)
NITRITE UR QL STRIP: NEGATIVE
NRBC BLD AUTO-RTO: 0 /100 WBC (ref 0–0.2)
PH UR STRIP.AUTO: 6 [PH] (ref 5–8)
PLATELET # BLD AUTO: 378 10*3/MM3 (ref 140–450)
PMV BLD AUTO: 9.2 FL (ref 6–12)
POTASSIUM SERPL-SCNC: 4.3 MMOL/L (ref 3.5–5.2)
PROT SERPL-MCNC: 7.5 G/DL (ref 6–8.5)
PROT UR QL STRIP: NEGATIVE
RBC # BLD AUTO: 4.74 10*6/MM3 (ref 3.77–5.28)
RBC # UR STRIP: ABNORMAL /HPF
REF LAB TEST METHOD: ABNORMAL
SODIUM SERPL-SCNC: 139 MMOL/L (ref 136–145)
SP GR UR STRIP: 1.01 (ref 1–1.03)
SQUAMOUS #/AREA URNS HPF: ABNORMAL /HPF
UROBILINOGEN UR QL STRIP: ABNORMAL
WBC # UR STRIP: ABNORMAL /HPF
WBC NRBC COR # BLD AUTO: 14.22 10*3/MM3 (ref 3.4–10.8)

## 2025-08-11 PROCEDURE — 85025 COMPLETE CBC W/AUTO DIFF WBC: CPT | Performed by: EMERGENCY MEDICINE

## 2025-08-11 PROCEDURE — 96365 THER/PROPH/DIAG IV INF INIT: CPT

## 2025-08-11 PROCEDURE — 80053 COMPREHEN METABOLIC PANEL: CPT | Performed by: EMERGENCY MEDICINE

## 2025-08-11 PROCEDURE — 81001 URINALYSIS AUTO W/SCOPE: CPT | Performed by: EMERGENCY MEDICINE

## 2025-08-11 PROCEDURE — 83690 ASSAY OF LIPASE: CPT | Performed by: EMERGENCY MEDICINE

## 2025-08-11 PROCEDURE — 74176 CT ABD & PELVIS W/O CONTRAST: CPT

## 2025-08-11 PROCEDURE — 96375 TX/PRO/DX INJ NEW DRUG ADDON: CPT

## 2025-08-11 PROCEDURE — 25010000002 KETOROLAC TROMETHAMINE PER 15 MG: Performed by: EMERGENCY MEDICINE

## 2025-08-11 PROCEDURE — 25010000002 CEFTRIAXONE PER 250 MG: Performed by: EMERGENCY MEDICINE

## 2025-08-11 PROCEDURE — 81025 URINE PREGNANCY TEST: CPT | Performed by: EMERGENCY MEDICINE

## 2025-08-11 RX ORDER — CEFDINIR 300 MG/1
300 CAPSULE ORAL 2 TIMES DAILY
Qty: 10 CAPSULE | Refills: 0 | Status: SHIPPED | OUTPATIENT
Start: 2025-08-11 | End: 2025-08-16

## 2025-08-11 RX ADMIN — SODIUM CHLORIDE 2000 MG: 900 INJECTION INTRAVENOUS at 01:17

## 2025-08-11 RX ADMIN — KETOROLAC TROMETHAMINE 30 MG: 30 INJECTION INTRAMUSCULAR; INTRAVENOUS at 00:08
